# Patient Record
Sex: MALE | Race: BLACK OR AFRICAN AMERICAN | NOT HISPANIC OR LATINO | Employment: FULL TIME | RURAL
[De-identification: names, ages, dates, MRNs, and addresses within clinical notes are randomized per-mention and may not be internally consistent; named-entity substitution may affect disease eponyms.]

---

## 2021-02-17 ENCOUNTER — HISTORICAL (OUTPATIENT)
Dept: ADMINISTRATIVE | Facility: HOSPITAL | Age: 56
End: 2021-02-17

## 2021-05-07 DIAGNOSIS — Z98.890 S/P RIGHT ROTATOR CUFF REPAIR: Primary | ICD-10-CM

## 2021-05-25 ENCOUNTER — DOCUMENTATION ONLY (OUTPATIENT)
Dept: REHABILITATION | Facility: HOSPITAL | Age: 56
End: 2021-05-25

## 2021-05-25 ENCOUNTER — CLINICAL SUPPORT (OUTPATIENT)
Dept: REHABILITATION | Facility: HOSPITAL | Age: 56
End: 2021-05-25
Payer: OTHER MISCELLANEOUS

## 2021-05-25 DIAGNOSIS — Z98.890 S/P RIGHT ROTATOR CUFF REPAIR: ICD-10-CM

## 2021-05-25 DIAGNOSIS — Z98.890 STATUS POST RIGHT ROTATOR CUFF REPAIR: ICD-10-CM

## 2021-05-25 PROCEDURE — 97140 MANUAL THERAPY 1/> REGIONS: CPT | Mod: PN

## 2021-05-25 PROCEDURE — 97110 THERAPEUTIC EXERCISES: CPT | Mod: PN

## 2021-05-27 ENCOUNTER — CLINICAL SUPPORT (OUTPATIENT)
Dept: REHABILITATION | Facility: HOSPITAL | Age: 56
End: 2021-05-27
Payer: OTHER MISCELLANEOUS

## 2021-05-27 DIAGNOSIS — Z98.890 S/P RIGHT ROTATOR CUFF REPAIR: Primary | ICD-10-CM

## 2021-05-27 PROCEDURE — 97140 MANUAL THERAPY 1/> REGIONS: CPT | Mod: PN

## 2021-05-27 PROCEDURE — 97110 THERAPEUTIC EXERCISES: CPT | Mod: PN

## 2021-06-01 ENCOUNTER — CLINICAL SUPPORT (OUTPATIENT)
Dept: REHABILITATION | Facility: HOSPITAL | Age: 56
End: 2021-06-01
Payer: OTHER MISCELLANEOUS

## 2021-06-01 DIAGNOSIS — Z98.890 S/P RIGHT ROTATOR CUFF REPAIR: Primary | ICD-10-CM

## 2021-06-01 PROCEDURE — 97110 THERAPEUTIC EXERCISES: CPT | Mod: PN

## 2021-06-01 PROCEDURE — 97140 MANUAL THERAPY 1/> REGIONS: CPT | Mod: PN

## 2021-06-03 ENCOUNTER — CLINICAL SUPPORT (OUTPATIENT)
Dept: REHABILITATION | Facility: HOSPITAL | Age: 56
End: 2021-06-03
Payer: OTHER MISCELLANEOUS

## 2021-06-03 DIAGNOSIS — Z98.890 S/P COMPLETE REPAIR OF ROTATOR CUFF: Primary | ICD-10-CM

## 2021-06-03 PROCEDURE — 97110 THERAPEUTIC EXERCISES: CPT | Mod: PN

## 2021-06-03 PROCEDURE — 97140 MANUAL THERAPY 1/> REGIONS: CPT | Mod: PN

## 2021-06-08 ENCOUNTER — CLINICAL SUPPORT (OUTPATIENT)
Dept: REHABILITATION | Facility: HOSPITAL | Age: 56
End: 2021-06-08
Payer: OTHER MISCELLANEOUS

## 2021-06-08 DIAGNOSIS — Z98.890 S/P COMPLETE REPAIR OF ROTATOR CUFF: Primary | ICD-10-CM

## 2021-06-08 PROCEDURE — 97110 THERAPEUTIC EXERCISES: CPT | Mod: PN

## 2021-06-08 PROCEDURE — 97140 MANUAL THERAPY 1/> REGIONS: CPT | Mod: PN

## 2021-06-16 ENCOUNTER — CLINICAL SUPPORT (OUTPATIENT)
Dept: REHABILITATION | Facility: HOSPITAL | Age: 56
End: 2021-06-16
Payer: OTHER MISCELLANEOUS

## 2021-06-16 DIAGNOSIS — Z98.890 S/P COMPLETE REPAIR OF ROTATOR CUFF: Primary | ICD-10-CM

## 2021-06-16 PROCEDURE — 97140 MANUAL THERAPY 1/> REGIONS: CPT | Mod: PN

## 2021-06-16 PROCEDURE — 97110 THERAPEUTIC EXERCISES: CPT | Mod: PN

## 2021-06-18 ENCOUNTER — CLINICAL SUPPORT (OUTPATIENT)
Dept: REHABILITATION | Facility: HOSPITAL | Age: 56
End: 2021-06-18
Payer: OTHER MISCELLANEOUS

## 2021-06-18 DIAGNOSIS — Z98.890 S/P COMPLETE REPAIR OF ROTATOR CUFF: Primary | ICD-10-CM

## 2021-06-18 PROCEDURE — 97110 THERAPEUTIC EXERCISES: CPT | Mod: PN

## 2021-06-18 PROCEDURE — 97140 MANUAL THERAPY 1/> REGIONS: CPT | Mod: PN

## 2021-06-22 ENCOUNTER — CLINICAL SUPPORT (OUTPATIENT)
Dept: REHABILITATION | Facility: HOSPITAL | Age: 56
End: 2021-06-22
Payer: OTHER MISCELLANEOUS

## 2021-06-22 DIAGNOSIS — Z98.890 S/P COMPLETE REPAIR OF ROTATOR CUFF: Primary | ICD-10-CM

## 2021-06-22 PROCEDURE — 97110 THERAPEUTIC EXERCISES: CPT | Mod: PN

## 2021-06-22 PROCEDURE — 97140 MANUAL THERAPY 1/> REGIONS: CPT | Mod: PN

## 2021-06-24 ENCOUNTER — CLINICAL SUPPORT (OUTPATIENT)
Dept: REHABILITATION | Facility: HOSPITAL | Age: 56
End: 2021-06-24
Payer: OTHER MISCELLANEOUS

## 2021-06-24 PROCEDURE — 97110 THERAPEUTIC EXERCISES: CPT | Mod: PN

## 2021-06-24 PROCEDURE — 97140 MANUAL THERAPY 1/> REGIONS: CPT | Mod: PN

## 2021-06-29 ENCOUNTER — CLINICAL SUPPORT (OUTPATIENT)
Dept: REHABILITATION | Facility: HOSPITAL | Age: 56
End: 2021-06-29
Payer: OTHER MISCELLANEOUS

## 2021-06-29 DIAGNOSIS — Z98.890 STATUS POST RIGHT ROTATOR CUFF REPAIR: ICD-10-CM

## 2021-06-29 PROCEDURE — 97110 THERAPEUTIC EXERCISES: CPT | Mod: PN

## 2021-06-29 PROCEDURE — 97140 MANUAL THERAPY 1/> REGIONS: CPT | Mod: PN

## 2021-07-01 ENCOUNTER — CLINICAL SUPPORT (OUTPATIENT)
Dept: REHABILITATION | Facility: HOSPITAL | Age: 56
End: 2021-07-01
Payer: OTHER MISCELLANEOUS

## 2021-07-01 DIAGNOSIS — Z98.890 STATUS POST RIGHT ROTATOR CUFF REPAIR: ICD-10-CM

## 2021-07-01 PROCEDURE — 97110 THERAPEUTIC EXERCISES: CPT | Mod: PN

## 2021-07-01 PROCEDURE — 97140 MANUAL THERAPY 1/> REGIONS: CPT | Mod: PN

## 2021-07-15 ENCOUNTER — CLINICAL SUPPORT (OUTPATIENT)
Dept: REHABILITATION | Facility: HOSPITAL | Age: 56
End: 2021-07-15
Payer: OTHER MISCELLANEOUS

## 2021-07-15 DIAGNOSIS — Z98.890 STATUS POST RIGHT ROTATOR CUFF REPAIR: ICD-10-CM

## 2021-07-15 PROCEDURE — 97110 THERAPEUTIC EXERCISES: CPT | Mod: PN

## 2021-07-20 ENCOUNTER — CLINICAL SUPPORT (OUTPATIENT)
Dept: REHABILITATION | Facility: HOSPITAL | Age: 56
End: 2021-07-20
Payer: OTHER MISCELLANEOUS

## 2021-07-20 DIAGNOSIS — Z98.890 STATUS POST RIGHT ROTATOR CUFF REPAIR: ICD-10-CM

## 2021-07-20 PROCEDURE — 97140 MANUAL THERAPY 1/> REGIONS: CPT | Mod: PN

## 2021-07-20 PROCEDURE — 97110 THERAPEUTIC EXERCISES: CPT | Mod: PN

## 2021-07-22 ENCOUNTER — CLINICAL SUPPORT (OUTPATIENT)
Dept: REHABILITATION | Facility: HOSPITAL | Age: 56
End: 2021-07-22
Payer: OTHER MISCELLANEOUS

## 2021-07-22 DIAGNOSIS — Z98.890 STATUS POST RIGHT ROTATOR CUFF REPAIR: ICD-10-CM

## 2021-07-22 PROCEDURE — 97140 MANUAL THERAPY 1/> REGIONS: CPT | Mod: PN

## 2021-07-22 PROCEDURE — 97110 THERAPEUTIC EXERCISES: CPT | Mod: PN

## 2021-07-27 ENCOUNTER — CLINICAL SUPPORT (OUTPATIENT)
Dept: REHABILITATION | Facility: HOSPITAL | Age: 56
End: 2021-07-27
Payer: OTHER MISCELLANEOUS

## 2021-07-27 DIAGNOSIS — Z98.890 STATUS POST RIGHT ROTATOR CUFF REPAIR: ICD-10-CM

## 2021-07-27 PROCEDURE — 97140 MANUAL THERAPY 1/> REGIONS: CPT | Mod: PN

## 2021-07-27 PROCEDURE — 97110 THERAPEUTIC EXERCISES: CPT | Mod: PN

## 2021-07-29 ENCOUNTER — CLINICAL SUPPORT (OUTPATIENT)
Dept: REHABILITATION | Facility: HOSPITAL | Age: 56
End: 2021-07-29
Payer: OTHER MISCELLANEOUS

## 2021-07-29 DIAGNOSIS — Z98.890 STATUS POST RIGHT ROTATOR CUFF REPAIR: ICD-10-CM

## 2021-07-29 PROCEDURE — 97140 MANUAL THERAPY 1/> REGIONS: CPT | Mod: PN

## 2021-07-29 PROCEDURE — 97110 THERAPEUTIC EXERCISES: CPT | Mod: PN

## 2021-08-03 ENCOUNTER — CLINICAL SUPPORT (OUTPATIENT)
Dept: REHABILITATION | Facility: HOSPITAL | Age: 56
End: 2021-08-03
Payer: OTHER MISCELLANEOUS

## 2021-08-03 DIAGNOSIS — Z98.890 STATUS POST RIGHT ROTATOR CUFF REPAIR: ICD-10-CM

## 2021-08-03 PROCEDURE — 97140 MANUAL THERAPY 1/> REGIONS: CPT | Mod: PN

## 2021-08-03 PROCEDURE — 97110 THERAPEUTIC EXERCISES: CPT | Mod: PN

## 2021-08-05 ENCOUNTER — CLINICAL SUPPORT (OUTPATIENT)
Dept: REHABILITATION | Facility: HOSPITAL | Age: 56
End: 2021-08-05
Payer: OTHER MISCELLANEOUS

## 2021-08-05 DIAGNOSIS — Z98.890 STATUS POST RIGHT ROTATOR CUFF REPAIR: ICD-10-CM

## 2021-08-05 PROCEDURE — 97110 THERAPEUTIC EXERCISES: CPT | Mod: PN

## 2021-08-05 PROCEDURE — 97140 MANUAL THERAPY 1/> REGIONS: CPT | Mod: PN

## 2021-08-10 ENCOUNTER — CLINICAL SUPPORT (OUTPATIENT)
Dept: REHABILITATION | Facility: HOSPITAL | Age: 56
End: 2021-08-10
Payer: OTHER MISCELLANEOUS

## 2021-08-10 DIAGNOSIS — Z98.890 STATUS POST RIGHT ROTATOR CUFF REPAIR: ICD-10-CM

## 2021-08-10 PROCEDURE — 97110 THERAPEUTIC EXERCISES: CPT | Mod: PN

## 2021-08-10 PROCEDURE — 97140 MANUAL THERAPY 1/> REGIONS: CPT | Mod: PN

## 2021-08-12 ENCOUNTER — CLINICAL SUPPORT (OUTPATIENT)
Dept: REHABILITATION | Facility: HOSPITAL | Age: 56
End: 2021-08-12
Payer: OTHER MISCELLANEOUS

## 2021-08-12 DIAGNOSIS — Z98.890 STATUS POST RIGHT ROTATOR CUFF REPAIR: ICD-10-CM

## 2021-08-12 PROCEDURE — 97140 MANUAL THERAPY 1/> REGIONS: CPT | Mod: PN

## 2021-08-12 PROCEDURE — 97110 THERAPEUTIC EXERCISES: CPT | Mod: PN

## 2021-08-17 ENCOUNTER — CLINICAL SUPPORT (OUTPATIENT)
Dept: REHABILITATION | Facility: HOSPITAL | Age: 56
End: 2021-08-17
Payer: OTHER MISCELLANEOUS

## 2021-08-17 DIAGNOSIS — Z98.890 STATUS POST RIGHT ROTATOR CUFF REPAIR: ICD-10-CM

## 2021-08-17 PROCEDURE — 97140 MANUAL THERAPY 1/> REGIONS: CPT | Mod: PN

## 2021-08-17 PROCEDURE — 97110 THERAPEUTIC EXERCISES: CPT | Mod: PN

## 2021-08-19 ENCOUNTER — CLINICAL SUPPORT (OUTPATIENT)
Dept: REHABILITATION | Facility: HOSPITAL | Age: 56
End: 2021-08-19
Payer: OTHER MISCELLANEOUS

## 2021-08-19 DIAGNOSIS — Z98.890 STATUS POST RIGHT ROTATOR CUFF REPAIR: ICD-10-CM

## 2021-08-19 PROCEDURE — 97110 THERAPEUTIC EXERCISES: CPT | Mod: PN

## 2021-08-19 PROCEDURE — 97140 MANUAL THERAPY 1/> REGIONS: CPT | Mod: PN

## 2021-08-24 ENCOUNTER — CLINICAL SUPPORT (OUTPATIENT)
Dept: REHABILITATION | Facility: HOSPITAL | Age: 56
End: 2021-08-24
Payer: OTHER MISCELLANEOUS

## 2021-08-24 DIAGNOSIS — Z98.890 STATUS POST RIGHT ROTATOR CUFF REPAIR: ICD-10-CM

## 2021-08-24 PROCEDURE — 97110 THERAPEUTIC EXERCISES: CPT | Mod: PN

## 2021-08-24 PROCEDURE — 97140 MANUAL THERAPY 1/> REGIONS: CPT | Mod: PN

## 2021-08-26 ENCOUNTER — CLINICAL SUPPORT (OUTPATIENT)
Dept: REHABILITATION | Facility: HOSPITAL | Age: 56
End: 2021-08-26
Payer: OTHER MISCELLANEOUS

## 2021-08-26 DIAGNOSIS — Z98.890 STATUS POST RIGHT ROTATOR CUFF REPAIR: ICD-10-CM

## 2021-08-26 PROCEDURE — 97110 THERAPEUTIC EXERCISES: CPT | Mod: PN

## 2021-08-26 PROCEDURE — 97140 MANUAL THERAPY 1/> REGIONS: CPT | Mod: PN

## 2021-09-03 ENCOUNTER — CLINICAL SUPPORT (OUTPATIENT)
Dept: REHABILITATION | Facility: HOSPITAL | Age: 56
End: 2021-09-03
Payer: OTHER MISCELLANEOUS

## 2021-09-03 DIAGNOSIS — Z98.890 STATUS POST RIGHT ROTATOR CUFF REPAIR: ICD-10-CM

## 2021-09-03 PROCEDURE — 97110 THERAPEUTIC EXERCISES: CPT | Mod: PN

## 2021-09-03 PROCEDURE — 97140 MANUAL THERAPY 1/> REGIONS: CPT | Mod: PN

## 2021-09-07 ENCOUNTER — CLINICAL SUPPORT (OUTPATIENT)
Dept: REHABILITATION | Facility: HOSPITAL | Age: 56
End: 2021-09-07
Payer: OTHER MISCELLANEOUS

## 2021-09-07 DIAGNOSIS — Z98.890 STATUS POST RIGHT ROTATOR CUFF REPAIR: ICD-10-CM

## 2021-09-07 PROCEDURE — 97140 MANUAL THERAPY 1/> REGIONS: CPT | Mod: PN

## 2021-09-07 PROCEDURE — 97110 THERAPEUTIC EXERCISES: CPT | Mod: PN

## 2021-09-09 ENCOUNTER — CLINICAL SUPPORT (OUTPATIENT)
Dept: REHABILITATION | Facility: HOSPITAL | Age: 56
End: 2021-09-09
Payer: OTHER MISCELLANEOUS

## 2021-09-09 DIAGNOSIS — Z98.890 STATUS POST RIGHT ROTATOR CUFF REPAIR: ICD-10-CM

## 2021-09-09 PROCEDURE — 97110 THERAPEUTIC EXERCISES: CPT | Mod: PN

## 2021-09-09 PROCEDURE — 97140 MANUAL THERAPY 1/> REGIONS: CPT | Mod: PN

## 2021-09-14 ENCOUNTER — CLINICAL SUPPORT (OUTPATIENT)
Dept: REHABILITATION | Facility: HOSPITAL | Age: 56
End: 2021-09-14
Payer: OTHER MISCELLANEOUS

## 2021-09-14 DIAGNOSIS — Z98.890 STATUS POST RIGHT ROTATOR CUFF REPAIR: ICD-10-CM

## 2021-09-14 PROCEDURE — 97140 MANUAL THERAPY 1/> REGIONS: CPT | Mod: PN

## 2021-09-14 PROCEDURE — 97110 THERAPEUTIC EXERCISES: CPT | Mod: PN

## 2021-09-16 ENCOUNTER — CLINICAL SUPPORT (OUTPATIENT)
Dept: REHABILITATION | Facility: HOSPITAL | Age: 56
End: 2021-09-16
Payer: OTHER MISCELLANEOUS

## 2021-09-16 DIAGNOSIS — Z98.890 STATUS POST RIGHT ROTATOR CUFF REPAIR: ICD-10-CM

## 2021-09-16 PROCEDURE — 97110 THERAPEUTIC EXERCISES: CPT | Mod: PN

## 2021-09-16 PROCEDURE — 97140 MANUAL THERAPY 1/> REGIONS: CPT | Mod: PN

## 2021-09-21 ENCOUNTER — CLINICAL SUPPORT (OUTPATIENT)
Dept: REHABILITATION | Facility: HOSPITAL | Age: 56
End: 2021-09-21
Payer: OTHER MISCELLANEOUS

## 2021-09-21 DIAGNOSIS — Z98.890 S/P COMPLETE REPAIR OF ROTATOR CUFF: Primary | ICD-10-CM

## 2021-09-21 PROCEDURE — 97140 MANUAL THERAPY 1/> REGIONS: CPT | Mod: PN

## 2021-09-21 PROCEDURE — 97110 THERAPEUTIC EXERCISES: CPT | Mod: PN

## 2021-09-23 ENCOUNTER — CLINICAL SUPPORT (OUTPATIENT)
Dept: REHABILITATION | Facility: HOSPITAL | Age: 56
End: 2021-09-23
Payer: OTHER MISCELLANEOUS

## 2021-09-23 DIAGNOSIS — Z98.890 STATUS POST RIGHT ROTATOR CUFF REPAIR: ICD-10-CM

## 2021-09-23 PROCEDURE — 97140 MANUAL THERAPY 1/> REGIONS: CPT | Mod: PN

## 2021-09-23 PROCEDURE — 97110 THERAPEUTIC EXERCISES: CPT | Mod: PN

## 2021-09-28 ENCOUNTER — CLINICAL SUPPORT (OUTPATIENT)
Dept: REHABILITATION | Facility: HOSPITAL | Age: 56
End: 2021-09-28
Payer: OTHER MISCELLANEOUS

## 2021-09-28 DIAGNOSIS — Z98.890 STATUS POST RIGHT ROTATOR CUFF REPAIR: Primary | ICD-10-CM

## 2021-09-28 PROCEDURE — 97110 THERAPEUTIC EXERCISES: CPT | Mod: PN

## 2021-09-28 PROCEDURE — 97140 MANUAL THERAPY 1/> REGIONS: CPT | Mod: PN

## 2021-09-30 ENCOUNTER — CLINICAL SUPPORT (OUTPATIENT)
Dept: REHABILITATION | Facility: HOSPITAL | Age: 56
End: 2021-09-30
Payer: OTHER MISCELLANEOUS

## 2021-09-30 DIAGNOSIS — Z98.890 STATUS POST RIGHT ROTATOR CUFF REPAIR: ICD-10-CM

## 2021-09-30 PROCEDURE — 97140 MANUAL THERAPY 1/> REGIONS: CPT | Mod: PN

## 2021-09-30 PROCEDURE — 97110 THERAPEUTIC EXERCISES: CPT | Mod: PN

## 2021-10-05 ENCOUNTER — CLINICAL SUPPORT (OUTPATIENT)
Dept: REHABILITATION | Facility: HOSPITAL | Age: 56
End: 2021-10-05
Payer: OTHER MISCELLANEOUS

## 2021-10-05 DIAGNOSIS — Z98.890 STATUS POST RIGHT ROTATOR CUFF REPAIR: ICD-10-CM

## 2021-10-05 PROCEDURE — 97110 THERAPEUTIC EXERCISES: CPT | Mod: PN

## 2021-10-05 PROCEDURE — 97140 MANUAL THERAPY 1/> REGIONS: CPT | Mod: PN

## 2021-10-07 ENCOUNTER — CLINICAL SUPPORT (OUTPATIENT)
Dept: REHABILITATION | Facility: HOSPITAL | Age: 56
End: 2021-10-07
Payer: OTHER MISCELLANEOUS

## 2021-10-07 DIAGNOSIS — Z98.890 STATUS POST RIGHT ROTATOR CUFF REPAIR: ICD-10-CM

## 2021-10-07 PROCEDURE — 97140 MANUAL THERAPY 1/> REGIONS: CPT | Mod: PN

## 2021-10-07 PROCEDURE — 97110 THERAPEUTIC EXERCISES: CPT | Mod: PN

## 2021-10-12 ENCOUNTER — CLINICAL SUPPORT (OUTPATIENT)
Dept: REHABILITATION | Facility: HOSPITAL | Age: 56
End: 2021-10-12
Payer: OTHER MISCELLANEOUS

## 2021-10-12 DIAGNOSIS — Z98.890 STATUS POST RIGHT ROTATOR CUFF REPAIR: ICD-10-CM

## 2021-10-12 PROCEDURE — 97110 THERAPEUTIC EXERCISES: CPT | Mod: PN

## 2021-10-12 PROCEDURE — 97140 MANUAL THERAPY 1/> REGIONS: CPT | Mod: PN

## 2021-10-14 ENCOUNTER — CLINICAL SUPPORT (OUTPATIENT)
Dept: REHABILITATION | Facility: HOSPITAL | Age: 56
End: 2021-10-14
Payer: OTHER MISCELLANEOUS

## 2021-10-14 DIAGNOSIS — Z98.890 STATUS POST RIGHT ROTATOR CUFF REPAIR: ICD-10-CM

## 2021-10-14 PROCEDURE — 97110 THERAPEUTIC EXERCISES: CPT | Mod: PN

## 2021-10-14 PROCEDURE — 97140 MANUAL THERAPY 1/> REGIONS: CPT | Mod: PN

## 2021-10-19 ENCOUNTER — CLINICAL SUPPORT (OUTPATIENT)
Dept: REHABILITATION | Facility: HOSPITAL | Age: 56
End: 2021-10-19
Payer: OTHER MISCELLANEOUS

## 2021-10-19 DIAGNOSIS — Z98.890 STATUS POST RIGHT ROTATOR CUFF REPAIR: Primary | ICD-10-CM

## 2021-10-19 PROCEDURE — 97014 ELECTRIC STIMULATION THERAPY: CPT | Mod: PN

## 2021-10-19 PROCEDURE — 97110 THERAPEUTIC EXERCISES: CPT | Mod: PN

## 2021-10-19 PROCEDURE — 97140 MANUAL THERAPY 1/> REGIONS: CPT | Mod: PN

## 2021-10-21 ENCOUNTER — CLINICAL SUPPORT (OUTPATIENT)
Dept: REHABILITATION | Facility: HOSPITAL | Age: 56
End: 2021-10-21
Payer: OTHER MISCELLANEOUS

## 2021-10-21 DIAGNOSIS — Z98.890 STATUS POST RIGHT ROTATOR CUFF REPAIR: ICD-10-CM

## 2021-10-21 PROCEDURE — 97110 THERAPEUTIC EXERCISES: CPT | Mod: PN

## 2021-10-21 PROCEDURE — 97140 MANUAL THERAPY 1/> REGIONS: CPT | Mod: PN

## 2021-10-26 ENCOUNTER — CLINICAL SUPPORT (OUTPATIENT)
Dept: REHABILITATION | Facility: HOSPITAL | Age: 56
End: 2021-10-26
Payer: OTHER MISCELLANEOUS

## 2021-10-26 DIAGNOSIS — Z98.890 STATUS POST RIGHT ROTATOR CUFF REPAIR: ICD-10-CM

## 2021-10-26 PROCEDURE — 97110 THERAPEUTIC EXERCISES: CPT | Mod: PN

## 2021-10-26 PROCEDURE — 97140 MANUAL THERAPY 1/> REGIONS: CPT | Mod: PN

## 2021-10-28 ENCOUNTER — CLINICAL SUPPORT (OUTPATIENT)
Dept: REHABILITATION | Facility: HOSPITAL | Age: 56
End: 2021-10-28
Payer: OTHER MISCELLANEOUS

## 2021-10-28 DIAGNOSIS — Z98.890 STATUS POST RIGHT ROTATOR CUFF REPAIR: ICD-10-CM

## 2021-10-28 PROCEDURE — 97140 MANUAL THERAPY 1/> REGIONS: CPT | Mod: PN

## 2021-10-28 PROCEDURE — 97110 THERAPEUTIC EXERCISES: CPT | Mod: PN

## 2021-11-02 ENCOUNTER — CLINICAL SUPPORT (OUTPATIENT)
Dept: REHABILITATION | Facility: HOSPITAL | Age: 56
End: 2021-11-02
Payer: OTHER MISCELLANEOUS

## 2021-11-02 DIAGNOSIS — Z98.890 S/P COMPLETE REPAIR OF ROTATOR CUFF: Primary | ICD-10-CM

## 2021-11-02 DIAGNOSIS — Z98.890 S/P RIGHT ROTATOR CUFF REPAIR: ICD-10-CM

## 2021-11-02 PROCEDURE — 97110 THERAPEUTIC EXERCISES: CPT | Mod: PN,CQ

## 2021-11-02 PROCEDURE — 97140 MANUAL THERAPY 1/> REGIONS: CPT | Mod: PN,CQ

## 2022-10-18 ENCOUNTER — OFFICE VISIT (OUTPATIENT)
Dept: FAMILY MEDICINE | Facility: CLINIC | Age: 57
End: 2022-10-18

## 2022-10-18 DIAGNOSIS — I10 HYPERTENSION, UNSPECIFIED TYPE: ICD-10-CM

## 2022-10-18 DIAGNOSIS — J01.40 ACUTE NON-RECURRENT PANSINUSITIS: Primary | ICD-10-CM

## 2022-10-18 PROCEDURE — 99203 PR OFFICE/OUTPT VISIT, NEW, LEVL III, 30-44 MIN: ICD-10-PCS | Mod: 25,,, | Performed by: NURSE PRACTITIONER

## 2022-10-18 PROCEDURE — 96372 THER/PROPH/DIAG INJ SC/IM: CPT | Mod: ,,, | Performed by: NURSE PRACTITIONER

## 2022-10-18 PROCEDURE — 99203 OFFICE O/P NEW LOW 30 MIN: CPT | Mod: 25,,, | Performed by: NURSE PRACTITIONER

## 2022-10-18 PROCEDURE — 96372 PR INJECTION,THERAP/PROPH/DIAG2ST, IM OR SUBCUT: ICD-10-PCS | Mod: ,,, | Performed by: NURSE PRACTITIONER

## 2022-10-18 RX ORDER — BISACODYL 5 MG
10 TABLET, DELAYED RELEASE (ENTERIC COATED) ORAL DAILY PRN
COMMUNITY

## 2022-10-18 RX ORDER — CARVEDILOL PHOSPHATE 20 MG/1
CAPSULE, EXTENDED RELEASE ORAL
COMMUNITY
Start: 2022-06-23

## 2022-10-18 RX ORDER — CETIRIZINE HYDROCHLORIDE 10 MG/1
TABLET ORAL
COMMUNITY
Start: 2021-11-29

## 2022-10-18 RX ORDER — LOSARTAN POTASSIUM 100 MG/1
TABLET ORAL
COMMUNITY
Start: 2022-07-18

## 2022-10-18 RX ORDER — SILDENAFIL 50 MG/1
TABLET, FILM COATED ORAL
COMMUNITY
Start: 2022-03-24

## 2022-10-18 RX ORDER — NIFEDIPINE 90 MG/1
1 TABLET, FILM COATED, EXTENDED RELEASE ORAL DAILY
COMMUNITY
Start: 2022-03-24

## 2022-10-18 RX ORDER — CYCLOBENZAPRINE HCL 10 MG
10 TABLET ORAL 2 TIMES DAILY PRN
COMMUNITY

## 2022-10-18 RX ORDER — FLUTICASONE PROPIONATE 50 MCG
2 SPRAY, SUSPENSION (ML) NASAL
COMMUNITY
Start: 2022-03-24

## 2022-10-18 RX ORDER — METHYLPREDNISOLONE ACETATE 40 MG/ML
40 INJECTION, SUSPENSION INTRA-ARTICULAR; INTRALESIONAL; INTRAMUSCULAR; SOFT TISSUE
Status: COMPLETED | OUTPATIENT
Start: 2022-10-18 | End: 2022-10-18

## 2022-10-18 RX ORDER — PANTOPRAZOLE SODIUM 20 MG/1
1 TABLET, DELAYED RELEASE ORAL DAILY
COMMUNITY
Start: 2022-08-30

## 2022-10-18 RX ORDER — ABACAVIR SULFATE, DOLUTEGRAVIR SODIUM, LAMIVUDINE 600; 50; 300 MG/1; MG/1; MG/1
TABLET, FILM COATED ORAL
COMMUNITY
Start: 2022-03-02

## 2022-10-18 RX ORDER — DULAGLUTIDE 1.5 MG/.5ML
1.5 INJECTION, SOLUTION SUBCUTANEOUS
COMMUNITY
Start: 2022-09-15

## 2022-10-18 RX ORDER — ROSUVASTATIN CALCIUM 20 MG/1
TABLET, COATED ORAL
COMMUNITY
Start: 2022-07-18

## 2022-10-18 RX ORDER — NAPROXEN SODIUM 220 MG/1
81 TABLET, FILM COATED ORAL
COMMUNITY

## 2022-10-18 RX ORDER — AZITHROMYCIN 250 MG/1
TABLET, FILM COATED ORAL
Qty: 6 TABLET | Refills: 0 | Status: SHIPPED | OUTPATIENT
Start: 2022-10-18 | End: 2022-10-23

## 2022-10-18 RX ORDER — CHOLECALCIFEROL (VITAMIN D3) 25 MCG
1000 TABLET ORAL
COMMUNITY

## 2022-10-18 RX ORDER — SPIRONOLACTONE 50 MG/1
1 TABLET, FILM COATED ORAL DAILY
COMMUNITY
Start: 2022-03-24

## 2022-10-18 RX ORDER — LINCOMYCIN HYDROCHLORIDE 300 MG/ML
600 INJECTION, SOLUTION INTRAMUSCULAR; INTRAVENOUS; SUBCONJUNCTIVAL
Status: COMPLETED | OUTPATIENT
Start: 2022-10-18 | End: 2022-10-18

## 2022-10-18 RX ADMIN — LINCOMYCIN HYDROCHLORIDE 600 MG: 300 INJECTION, SOLUTION INTRAMUSCULAR; INTRAVENOUS; SUBCONJUNCTIVAL at 04:10

## 2022-10-18 RX ADMIN — METHYLPREDNISOLONE ACETATE 40 MG: 40 INJECTION, SUSPENSION INTRA-ARTICULAR; INTRALESIONAL; INTRAMUSCULAR; SOFT TISSUE at 04:10

## 2022-10-18 NOTE — PROGRESS NOTES
Radha Balderrama NP   [place] [id number]  [place]  [address]      PATIENT NAME: Janak Hauser  : 1965  DATE: 10/18/22  MRN: [id number]      Billing Provider: Radha Balderrama NP  Level of Service: [place] OFFICE/OUTPT VISIT, MILIND FRAZIER III, 30-44 MIN  Patient PCP Information       Provider PCP Type    [facility]            Reason for Visit / Chief Complaint: Sinus Problem (Has been taking otc sinus meds for last two weeks for congestion and it will get better then come back)       Update PCP  Update Chief Complaint         History of Present Illness / Problem Focused Workflow     Janak Hauser presents to the clinic with Sinus Problem (Has been taking otc sinus meds for last two weeks for congestion and it will get better then come back)     56 year old male presents to clinic with complaints of sinus congestion, cough, post nasal drip for a couple weeks now. He states he has been taking otc sinus meds and it will get better then come back. Denies fever, denies known sick contacts.           Review of Systems     @Review of Systems   Constitutional:  Negative for activity change, appetite change, fatigue and fever.   HENT:  Positive for nasal congestion, postnasal drip, rhinorrhea and sinus pressure/congestion. Negative for ear pain and sore throat.    Eyes:  Negative for pain, redness, visual disturbance and eye dryness.   Respiratory:  Positive for cough. Negative for shortness of breath.    Cardiovascular:  Negative for chest pain and leg swelling.   Gastrointestinal:  Negative for abdominal distention, abdominal pain, constipation and diarrhea.   Endocrine: Negative for cold intolerance, heat intolerance and polyuria.   Genitourinary:  Negative for bladder incontinence, dysuria, frequency and urgency.   Musculoskeletal:  Negative for arthralgias, gait problem and myalgias.   Integumentary:  Negative for color change, rash and wound.   Allergic/Immunologic:  Negative for environmental allergies and food allergies.   Neurological:  Negative for dizziness, weakness, light-headedness and headaches.   Psychiatric/Behavioral:  Negative for behavioral problems and sleep disturbance.      Medical / Social / Family History     Past Medical History:   Diagnosis Date    Hypertension        Past Surgical History:   Procedure Laterality Date    JOINT REPLACEMENT Right     shoulder       Social History    reports that he has never smoked. He has never used smokeless tobacco. He reports that he does not currently use alcohol. He reports that he does not use drugs.    Family History  's family history is not on file.    Medications and Allergies     Medications  Outpatient Medications Marked as Taking for the 10/18/22 encounter (Office Visit) with Radha Balderrama NP   Medication Sig Dispense Refill    abacavir-dolutegravir-lamivud (TRIUMEQ) 600- mg Tab Take 1 tablet by mouth once a day as directed by physician.      ascorbic acid, vitamin C, (VITAMIN C) 100 MG tablet Take 100 mg by mouth.      aspirin 81 MG Chew Take 81 mg by mouth.      bisacodyL (DULCOLAX) 5 mg EC tablet Take 10 mg by mouth daily as needed.      CARVEDILOL PHOSPHATE 20 MG ORAL CM24 (COREG CR) 20 mg 24 hr capsule SMARTSI Capsule(s) By Mouth Every Evening      cetirizine (ZYRTEC) 10 MG tablet Take 1 tablet by mouth once a day as directed by physician.      cyclobenzaprine (FLEXERIL) 10 MG tablet Take 10 mg by mouth 2 (two) times daily as needed.      fluticasone propionate (FLONASE) 50 mcg/actuation nasal spray 2 sprays by Nasal route.      losartan (COZAAR) 100 MG tablet Take 1 tablet by mouth once a day as directed by physician.      NIFEdipine (ADALAT CC) 90 MG TbSR Take 1 tablet by mouth once daily.      pantoprazole (PROTONIX) 20 MG tablet Take 1 tablet by mouth once daily.      rosuvastatin (CRESTOR) 20 MG tablet Take 1 tablet by mouth once a day as directed by physician.      sildenafiL  (VIAGRA) 50 MG tablet Take 1/2 - 1 tablet by mouth as needed      spironolactone (ALDACTONE) 50 MG tablet Take 1 tablet by mouth once daily.      ticagrelor (BRILINTA) 90 mg tablet Take 1 tablet by mouth 2 (two) times daily.      TRULICITY 1.5 mg/0.5 mL pen injector Inject 1.5 mg into the skin every 7 days.      vitamin D (VITAMIN D3) 1000 units Tab Take 1,000 Units by mouth.         Allergies  Review of patient's allergies indicates:   Allergen Reactions    Hydrochlorothiazide Rash    Keflex [cephalexin]     Lisinopril     Metformin Rash       Physical Examination     Vitals:    10/18/22 1600   BP: (!) 148/74   Pulse:    Resp:    Temp:      Physical Exam  Vitals and nursing note reviewed.   HENT:      Head: Normocephalic.      Right Ear: Tympanic membrane normal.      Left Ear: Tympanic membrane normal.      Nose: Congestion and rhinorrhea present. Rhinorrhea is purulent.      Right Sinus: Maxillary sinus tenderness and frontal sinus tenderness present.      Left Sinus: Maxillary sinus tenderness and frontal sinus tenderness present.      Mouth/Throat:      Mouth: Mucous membranes are moist.      Pharynx: Oropharyngeal exudate (clear post nasal drainage) and posterior oropharyngeal erythema present.   Eyes:      Conjunctiva/sclera: Conjunctivae normal.   Cardiovascular:      Rate and Rhythm: Normal rate and regular rhythm.      Heart sounds: Normal heart sounds.   Pulmonary:      Effort: Pulmonary effort is normal.      Breath sounds: Normal breath sounds.   Abdominal:      General: Abdomen is flat. Bowel sounds are normal. There is no distension.      Palpations: Abdomen is soft.   Musculoskeletal:         General: No swelling or tenderness. Normal range of motion.      Cervical back: Normal range of motion.      Right lower leg: No edema.      Left lower leg: No edema.   Skin:     General: Skin is warm and dry.   Neurological:      Mental Status: He is alert. Mental status is at baseline.   Psychiatric:          Mood and Affect: Mood normal.         Behavior: Behavior normal.             No results found for: WBC, HGB, HCT, MCV, PLT       No results found for: NA, K, CL, CO2, GLU, BUN, CREATININE, CALCIUM, PROT, ALBUMIN, BILITOT, ALKPHOS, AST, ALT, ANIONGAP, ESTGFRAFRICA, EGFRNONAA   X-ray Shoulder 2 or More Views Right  Narrative: XR SHOULDER RIGHT COMPLETE 3 views     Indication: Pain, history of injury     Comparison: None available    Findings: No evidence of fracture seen.  The alignment of the joints  appears normal. Mild acromioclavicular joint and glenohumeral joint  degenerative change is present.  No soft tissue abnormality is seen.  Impression: Impression: No evidence of acute injury demonstrated    This report has been electronically signed by Brian Ribeiro  X-Ray Elbow 2 Views Right  Narrative: XR ELBOW RIGHT 2 views     Indication: Pain, history of injury     Comparison: None available    Findings: No evidence of fracture seen.  The alignment of the joints  appears normal.  No degenerative change is present.  No soft tissue  abnormality is seen.  Impression: Impression: No evidence of acute injury demonstrated.    This report has been electronically signed by Brian Ribeiro     Procedures   Assessment and Plan (including Health Maintenance)      Problem List  Smart Sets  Document Outside HM   :    Plan:           Problem List Items Addressed This Visit          ENT    Acute non-recurrent pansinusitis - Primary    Current Assessment & Plan     Lincocin and Depomedrol given IM in clinic.   Zithromax as ordered and Coricidin as needed.    Reviewed pathology of current symptoms, medication side effects/risk/benefits/directions on taking medications, and worsening or persistent symptoms that require follow up in next 2-3 days. Saline/steroid nasal sprays, antihistamine use, increase fluid intake, and multivitamin/elderberry/Zinc use were recommended. May take Tylenol or Motrin as needed for pain and/or fever.  Patient was instructed to take antibiotic as directed, complete entire course to avoid antibiotic resistance, and take OTC probiotic with antibiotic to prevent GI upset. Patient verbalized understanding of treatment plan and denies any questions.            Cardiac/Vascular    Hypertension    Current Assessment & Plan     BP elevated at today's visit. Patient states he has been taking OTC decongestants such as Sudafed. Instructed patient to use caution with OTC decongestants as they can raise BP. Instructed use Coricidin as needed. Will continue to monitor blood pressure and if it remains elevated we may need to increase his antihypertensives.             The patient has no Health Maintenance topics of status Not Due    No future appointments.     Health Maintenance Due   Topic Date Due    Hepatitis C Screening  Never done    Lipid Panel  Never done    HIV Screening  Never done    TETANUS VACCINE  Never done    Shingles Vaccine (1 of 2) Never done    Colorectal Cancer Screening  Never done    Pneumococcal Vaccines (Age 0-64) (3 - PCV) 10/07/2014    COVID-19 Vaccine (2 - Booster for Anaid series) 04/29/2021        No follow-ups on file.     Signature:  Radha Balderrama NP  [address]  [address]    Date of encounter: 10/18/22

## 2022-10-23 VITALS
HEIGHT: 66 IN | BODY MASS INDEX: 24.59 KG/M2 | WEIGHT: 153 LBS | DIASTOLIC BLOOD PRESSURE: 74 MMHG | RESPIRATION RATE: 18 BRPM | OXYGEN SATURATION: 98 % | TEMPERATURE: 97 F | HEART RATE: 98 BPM | SYSTOLIC BLOOD PRESSURE: 148 MMHG

## 2022-10-23 PROBLEM — J01.40 ACUTE NON-RECURRENT PANSINUSITIS: Status: ACTIVE | Noted: 2022-10-23

## 2022-10-23 NOTE — ASSESSMENT & PLAN NOTE
BP elevated at today's visit. Patient states he has been taking OTC decongestants such as Sudafed. Instructed patient to use caution with OTC decongestants as they can raise BP. Instructed use Coricidin as needed. Will continue to monitor blood pressure and if it remains elevated we may need to increase his antihypertensives.

## 2022-10-23 NOTE — ASSESSMENT & PLAN NOTE
Lincocin and Depomedrol given IM in clinic.   Zithromax as ordered and Coricidin as needed.    Reviewed pathology of current symptoms, medication side effects/risk/benefits/directions on taking medications, and worsening or persistent symptoms that require follow up in next 2-3 days. Saline/steroid nasal sprays, antihistamine use, increase fluid intake, and multivitamin/elderberry/Zinc use were recommended. May take Tylenol or Motrin as needed for pain and/or fever. Patient was instructed to take antibiotic as directed, complete entire course to avoid antibiotic resistance, and take OTC probiotic with antibiotic to prevent GI upset. Patient verbalized understanding of treatment plan and denies any questions.

## 2023-01-23 PROBLEM — J01.40 ACUTE NON-RECURRENT PANSINUSITIS: Status: RESOLVED | Noted: 2022-10-23 | Resolved: 2023-01-23
